# Patient Record
Sex: FEMALE | Race: WHITE | NOT HISPANIC OR LATINO | URBAN - METROPOLITAN AREA
[De-identification: names, ages, dates, MRNs, and addresses within clinical notes are randomized per-mention and may not be internally consistent; named-entity substitution may affect disease eponyms.]

---

## 2024-07-25 ENCOUNTER — APPOINTMENT (RX ONLY)
Dept: URBAN - METROPOLITAN AREA CLINIC 93 | Facility: CLINIC | Age: 49
Setting detail: DERMATOLOGY
End: 2024-07-25

## 2024-07-25 DIAGNOSIS — D22 MELANOCYTIC NEVI: ICD-10-CM

## 2024-07-25 PROBLEM — D22.39 MELANOCYTIC NEVI OF OTHER PARTS OF FACE: Status: ACTIVE | Noted: 2024-07-25

## 2024-07-25 PROCEDURE — ? COUNSELING

## 2024-07-25 PROCEDURE — ? CONSULTATION EXCISION

## 2024-07-25 PROCEDURE — 99203 OFFICE O/P NEW LOW 30 MIN: CPT

## 2024-07-25 PROCEDURE — ? ADDITIONAL NOTES

## 2024-07-25 ASSESSMENT — LOCATION SIMPLE DESCRIPTION DERM: LOCATION SIMPLE: LEFT CHEEK

## 2024-07-25 ASSESSMENT — LOCATION ZONE DERM: LOCATION ZONE: FACE

## 2024-07-25 ASSESSMENT — LOCATION DETAILED DESCRIPTION DERM: LOCATION DETAILED: LEFT CENTRAL MALAR CHEEK

## 2024-07-25 NOTE — HPI: SKIN LESION
What Type Of Note Output Would You Prefer (Optional)?: Bullet Format
Has Your Skin Lesion Been Treated?: not been treated
Is This A New Presentation, Or A Follow-Up?: Skin Lesion
Additional History: Pt notes she had biopsy of left cheek and has come back irregular.

## 2024-07-25 NOTE — PROCEDURE: CONSULTATION EXCISION
Detail Level: Detailed
Other Plan: Excision with Dr. Winchester - once we get colored photos we will reach out to Dr. OCONNELL
X Size Of Lesion In Cm (Optional): 0.4

## 2024-07-25 NOTE — PROCEDURE: ADDITIONAL NOTES
Additional Notes: 07/25/2024 Patient had a biopsy by [facility]. We received the records. However, the photos that were over are black and white and we were not  able to identify the correct location. \\n\\nWill have pt sign medical records release to receive colored photo via email.\\n\\nRecommended excision once colored photos are in.
Detail Level: Simple
Render Risk Assessment In Note?: yes

## 2024-08-13 ENCOUNTER — APPOINTMENT (RX ONLY)
Dept: URBAN - METROPOLITAN AREA CLINIC 93 | Facility: CLINIC | Age: 49
Setting detail: DERMATOLOGY
End: 2024-08-13

## 2024-08-13 DIAGNOSIS — L71.8 OTHER ROSACEA: ICD-10-CM

## 2024-08-13 DIAGNOSIS — D22 MELANOCYTIC NEVI: ICD-10-CM

## 2024-08-13 PROBLEM — D22.39 MELANOCYTIC NEVI OF OTHER PARTS OF FACE: Status: ACTIVE | Noted: 2024-08-13

## 2024-08-13 PROCEDURE — 99213 OFFICE O/P EST LOW 20 MIN: CPT

## 2024-08-13 PROCEDURE — ? MONITORING

## 2024-08-13 PROCEDURE — ? COUNSELING

## 2024-08-13 ASSESSMENT — LOCATION ZONE DERM: LOCATION ZONE: FACE

## 2024-08-13 ASSESSMENT — LOCATION DETAILED DESCRIPTION DERM
LOCATION DETAILED: LEFT MEDIAL MALAR CHEEK
LOCATION DETAILED: LEFT CENTRAL MALAR CHEEK

## 2024-08-13 ASSESSMENT — LOCATION SIMPLE DESCRIPTION DERM: LOCATION SIMPLE: LEFT CHEEK

## 2024-08-13 NOTE — HPI: SKIN LESION (DYSPLASTIC NEVUS)
Is This A New Presentation, Or A Follow-Up?: Dysplastic Nevus
When Was The Dysplastic Nevus Biopsied? (Optional): 5/21/24
Accession # (Optional): [id number]

## 2025-04-08 ENCOUNTER — APPOINTMENT (OUTPATIENT)
Dept: URBAN - METROPOLITAN AREA CLINIC 93 | Facility: CLINIC | Age: 50
Setting detail: DERMATOLOGY
End: 2025-04-08

## 2025-04-08 DIAGNOSIS — D22 MELANOCYTIC NEVI: ICD-10-CM | Status: STABLE

## 2025-04-08 PROBLEM — D22.39 MELANOCYTIC NEVI OF OTHER PARTS OF FACE: Status: ACTIVE | Noted: 2025-04-08

## 2025-04-08 PROCEDURE — ? MONITORING

## 2025-04-08 PROCEDURE — ? COUNSELING

## 2025-04-08 PROCEDURE — 99212 OFFICE O/P EST SF 10 MIN: CPT

## 2025-04-08 ASSESSMENT — LOCATION SIMPLE DESCRIPTION DERM: LOCATION SIMPLE: LEFT CHEEK

## 2025-04-08 ASSESSMENT — LOCATION DETAILED DESCRIPTION DERM: LOCATION DETAILED: LEFT INFERIOR CENTRAL MALAR CHEEK

## 2025-04-08 ASSESSMENT — LOCATION ZONE DERM: LOCATION ZONE: FACE

## 2025-04-08 NOTE — HPI: OTHER
Condition:: Dysplastic nevus on left central malar cheek, biopsied on May 22, 2024. \\nClinically monitoring.
Please Describe Your Condition:: Dysplastic nevus